# Patient Record
Sex: MALE | Race: OTHER
[De-identification: names, ages, dates, MRNs, and addresses within clinical notes are randomized per-mention and may not be internally consistent; named-entity substitution may affect disease eponyms.]

---

## 2019-07-30 ENCOUNTER — HOSPITAL ENCOUNTER (OUTPATIENT)
Dept: HOSPITAL 72 - PAN | Age: 69
Discharge: HOME | End: 2019-07-30
Payer: MEDICARE

## 2019-07-30 VITALS — DIASTOLIC BLOOD PRESSURE: 72 MMHG | SYSTOLIC BLOOD PRESSURE: 112 MMHG

## 2019-07-30 VITALS — WEIGHT: 191 LBS | HEIGHT: 69 IN | BODY MASS INDEX: 28.29 KG/M2

## 2019-07-30 DIAGNOSIS — Z90.49: ICD-10-CM

## 2019-07-30 DIAGNOSIS — K63.5: Primary | ICD-10-CM

## 2019-07-30 DIAGNOSIS — Z86.010: ICD-10-CM

## 2019-07-30 DIAGNOSIS — Z79.82: ICD-10-CM

## 2019-07-30 DIAGNOSIS — Z88.8: ICD-10-CM

## 2019-07-30 PROCEDURE — 99202 OFFICE O/P NEW SF 15 MIN: CPT

## 2019-07-30 NOTE — CONSULTATION
DATE OF CONSULTATION:  07/30/2019

CHIEF COMPLAINT:  Screening colonoscopy evaluation and history of colonic

polyps.



HISTORY OF PRESENT ILLNESS:  This is a very pleasant 68-year-old [place]

male with past medical history of colonic polyps.  His first colonoscopy

was a few years ago.  According to him, he had 10 polyps.  On the followup

colonoscopy, he had two polyps, which was over 5 years ago.  Now, he has

returned to us for repeat colonoscopy.



PAST MEDICAL HISTORY:  Significant for:



1. Colonic polyps.

2. Gallstone.



PAST SURGICAL HISTORY:

1. Right shoulder surgery.

2. Laparoscopic cholecystectomy.



MEDICATIONS:  _____ aspirin.



ALLERGIES:  Vancomycin.



FAMILY HISTORY:  Noncontributory.



SOCIAL HISTORY:  The patient drinks socially.  Denies any tobacco abuse.

Denies any IV drug abuse.  He is .  Lives at home.



REVIEW OF SYSTEMS:  A 10-point review of systems was performed and

pertinent positives in the HPI.



PHYSICAL EXAMINATION:

VITAL SIGNS:  Temperature 97.3, blood pressure is 112/78, pulse 64, and

respirations 20.

HEENT:  Normocephalic and atraumatic.  Sclerae are anicteric.

NECK:  Supple.  No evidence of obvious lymphadenopathy.

CARDIOVASCULAR:  Regular rate and rhythm.  Plus S1 and S2.  No obvious

murmur.

LUNGS:  Clear to auscultation bilaterally.

ABDOMEN:  Positive bowel sounds.  Soft, nontender.  No rebound.  No

guarding.  No peritoneal sign.

EXTREMITIES:  No cyanosis.  No clubbing.  No edema.



ASSESSMENT AND PLAN:  This is a 68-year-old male with past medical history

of colonic polyps, needs another repeat colonoscopy given 10 polyps in the

last colonoscopy.  The patient was given instruction for colonoscopy and

the prep.  We will schedule for colonoscopy next week.  We will go ahead

and proceed with his repeat colonoscopy.









  ______________________________________________

  Nadeem Guerrero M.D.





DR:  ANNA

D:  07/30/2019 10:42

T:  07/30/2019 21:13

JOB#:  [id number]

CC:

## 2019-08-12 ENCOUNTER — HOSPITAL ENCOUNTER (OUTPATIENT)
Dept: HOSPITAL 72 - GAS | Age: 69
Discharge: HOME | End: 2019-08-12
Payer: MEDICARE

## 2019-08-12 VITALS — DIASTOLIC BLOOD PRESSURE: 73 MMHG | SYSTOLIC BLOOD PRESSURE: 118 MMHG

## 2019-08-12 VITALS — HEIGHT: 69 IN | WEIGHT: 195 LBS | BODY MASS INDEX: 28.88 KG/M2

## 2019-08-12 VITALS — SYSTOLIC BLOOD PRESSURE: 129 MMHG | DIASTOLIC BLOOD PRESSURE: 81 MMHG

## 2019-08-12 VITALS — SYSTOLIC BLOOD PRESSURE: 128 MMHG | DIASTOLIC BLOOD PRESSURE: 67 MMHG

## 2019-08-12 VITALS — DIASTOLIC BLOOD PRESSURE: 75 MMHG | SYSTOLIC BLOOD PRESSURE: 116 MMHG

## 2019-08-12 VITALS — SYSTOLIC BLOOD PRESSURE: 125 MMHG | DIASTOLIC BLOOD PRESSURE: 79 MMHG

## 2019-08-12 VITALS — SYSTOLIC BLOOD PRESSURE: 115 MMHG | DIASTOLIC BLOOD PRESSURE: 78 MMHG

## 2019-08-12 VITALS — DIASTOLIC BLOOD PRESSURE: 78 MMHG | SYSTOLIC BLOOD PRESSURE: 123 MMHG

## 2019-08-12 VITALS — DIASTOLIC BLOOD PRESSURE: 79 MMHG | SYSTOLIC BLOOD PRESSURE: 122 MMHG

## 2019-08-12 VITALS — SYSTOLIC BLOOD PRESSURE: 118 MMHG | DIASTOLIC BLOOD PRESSURE: 76 MMHG

## 2019-08-12 DIAGNOSIS — K29.50: ICD-10-CM

## 2019-08-12 DIAGNOSIS — Z79.899: ICD-10-CM

## 2019-08-12 DIAGNOSIS — Z12.11: Primary | ICD-10-CM

## 2019-08-12 DIAGNOSIS — K57.90: ICD-10-CM

## 2019-08-12 DIAGNOSIS — Z88.8: ICD-10-CM

## 2019-08-12 DIAGNOSIS — Z79.82: ICD-10-CM

## 2019-08-12 DIAGNOSIS — K44.9: ICD-10-CM

## 2019-08-12 DIAGNOSIS — K21.0: ICD-10-CM

## 2019-08-12 PROCEDURE — 43239 EGD BIOPSY SINGLE/MULTIPLE: CPT

## 2019-08-12 PROCEDURE — 94003 VENT MGMT INPAT SUBQ DAY: CPT

## 2019-08-12 PROCEDURE — 45380 COLONOSCOPY AND BIOPSY: CPT

## 2019-08-12 PROCEDURE — 94150 VITAL CAPACITY TEST: CPT

## 2019-08-12 PROCEDURE — 93005 ELECTROCARDIOGRAM TRACING: CPT

## 2019-08-12 NOTE — SHORT STAY SURGERY H&P
History of Present Illness


History of Present Illness


Chief Complaint


see recent office note


HPI


Ria Gonzalez is a 68 year old male who was admitted on  for Gerd,Abdominal 

Pain, Colon Screening





Patient History


Allergies:  


Coded Allergies:  


     VANCOMYCIN (Verified  Allergy, Unknown, 8/12/19)





Medication History


Scheduled


Aspirin Ec* (Aspirin Ec*), 81 MG ORAL DAILY, (Reported)


Dutasteride (Avodart), 0.5 MG ORAL DAILY, (Reported)


Icosapent Ethyl (Vascepa), 1 GM PO DAILY, (Reported)


Saw Hugo (Saw Hugo), Unknown Dose PO DAILY, (Reported)


Tamsulosin HCl (Flomax), 0.4 MG ORAL DAILY, (Reported)





Physical Exam


Vital Signs





Last Vital Signs








  Date Time  Temp Pulse Resp B/P (MAP) Pulse Ox O2 Delivery O2 Flow Rate FiO2


 


8/12/19 08:06      Room Air  


 


8/12/19 07:56 97.3 68 18 125/79 98   











Plan


Attestation


Are the patient's medical conditions optimized for surgery?











Nadeem Guerrero MD Aug 12, 2019 09:25

## 2019-08-12 NOTE — PROCEDURE NOTE
DATE OF PROCEDURE:  08/12/2019

SURGEON:  Nadeem Guerrero M.D.



PROCEDURE:  Upper endoscopy with biopsy and colonoscopy with

biopsy.



ANESTHESIA:  Per Dr. Lenard Rajput.



INSTRUMENT:  Olympus adult flexible upper endoscope and

colonoscope.



INDICATION:  Screening colonoscopy evaluation and chronic GERD.



REASON FOR PROCEDURE:  The procedure, risks, benefits, and possible

consequences, including hemorrhage, aspiration, perforation and infection,

and alternative treatments, were explained to the patient/legal guardian

by Dr. Nadeem Guerrero and the patient/legal guardian understood and

accepted these risks.



PROCEDURE IN DETAIL:  After informed consent was obtained and the patient

was adequately sedated, Olympus upper endoscope was advanced from the

mouth into the second portion of duodenum and retroflexion was performed

in the stomach.



GE junction was found to be about 35 cm from the incisors.  The patient

has evidence of 4 cm hiatal hernia.  Minimum distal esophagitis.  In the

stomach, there was multiple erosions in the antrum of the stomach.  Biopsy

from antrum and body was obtained to rule out H. pylori infection.  At

this time, the upper endoscope was retrieved and the patient was turned

over for colonoscopy.



First, rectal exam was performed, which was normal.  Then, the scope was

advanced from the rectum into the cecum and then subsequently into the

terminal ileum.  Quality of prep was very good.



There was multiple erosions in the ileum, which were biopsied to rule

out colon disease.



The patient had diverticulosis mostly in the left colon.  No obvious

diverticulitis.  No polyp was seen.  Retroflexion of rectum showed

evidence of no obvious large internal hemorrhoids.



SUMMARY OF FINDINGS:

1. A 4 cm hiatal hernia.

2. Minimum distal esophagitis.

3. Multiple antral erosions status post biopsy.

4. Multiple TI erosions status post biopsy.

5. Diverticulosis of the left colon.



RECOMMENDATIONS:  Follow up pathology and treat accordingly.









  ______________________________________________

  Nadeem Guerrero M.D.





DR:  ANNA

D:  08/12/2019 09:48

T:  08/12/2019 19:59

JOB#:  [id number]

CC:

## 2019-08-12 NOTE — PRE-PROCEDURE NOTE/ATTESTATION
Pre-Procedure Note/Attestation


Complete Prior to Procedure


Planned Procedure:  not applicable


Procedure Narrative:


esophagogastroduodenoscopy and colonoscopy





Indications for Procedure


Pre-Operative Diagnosis:


screening colon, GERD





Attestation


I attest that I discussed the nature of the procedure; its benefits; risks and 

complications; and alternatives (and the risks and benefits of such alternatives

), prior to the procedure, with the patient (or the patient's legal 

representative).





I attest that, if there was a reasonable possibility of needing a blood 

transfusion, the patient (or the patient's legal representative) was given the 

[facility] standardized written summary, pursuant 

to the [place] Blood Safety Act ([place] Health and Safety Code # 1645, as 

amended).





I attest that I re-evaluated the patient just prior to the surgery and that 

there has been no change in the patient's H&P, except as documented below:











Nadeem Guerrero MD Aug 12, 2019 09:24

## 2019-08-12 NOTE — ENDOSCOPY PROCEDURE NOTE
Endoscopy Procedure Note


General


Indication for Procedure:  screening colon, GERD


Procedures Performed:  EGD


Operative Findings/Diagnosis:  gastritis, diverticulosis


Specimen:  yes


Pt Tolerated Procedure Well:  Yes


Estimated Blood Loss:  none





Anesthesia


Anesthesiologist:  ramos


Anesthesia:  MAC





Inserted Devices


Implant(s) used?:  No





Quality


Quality of Bowel Preparation:  Good


Did scope reach the cecum?:  Yes


Was there any complications?:  No





GI Core Measures


50 yrs or older w/o bx or poly:  No


10yrs. F/U recommended:  Yes


If not recommended, why?:  Above average risk


18 years or older w/prev. colo:  No











Nadeem Guerrero MD Aug 12, 2019 09:48

## 2019-08-12 NOTE — 48 HOUR POST ANESTHESIA EVAL
Post Anesthesia Evaluation


Procedure:  EGD Colonoscopy


Date of Evaluation:  Aug 12, 2019


Time of Evaluation:  10:30


Blood Pressure Systolic:  118


0:  74


Pulse Rate:  68


Respiratory Rate:  20


Temperature (Fahrenheit):  97.6


O2 Sat by Pulse Oximetry:  98


Airway:  patent


Nausea:  No


Vomiting:  No


Pain Intensity:  1


Hydration Status:  adequate


Cardiopulmonary Status:


stable


Mental Status/LOC:  patient returned to baseline


Follow-up Care/Observations:


n/a


Post-Anesthesia Complications:


none


Follow-up care needed:  ready to discharge











Lenard Rajput MD Aug 12, 2019 10:31

## 2019-08-12 NOTE — IMMEDIATE POST-OP EVALUATION
Immediate Post-Op Evalulation


Immediate Post-Op Evalulation


Procedure:  EGD Colonoscopy


Date of Evaluation:  Aug 12, 2019


Time of Evaluation:  09:55


IV Fluids:  600


Blood Products:  none


Estimated Blood Loss:  none


Urinary Output:  none


Blood Pressure Systolic:  118


Blood Pressure Diastolic:  73


Pulse Rate:  64


Respiratory Rate:  20


O2 Sat by Pulse Oximetry:  98


Temperature (Fahrenheit):  97.6


Pain Score (1-10):  1


Nausea:  No


Vomiting:  No


Complications


none


Patient Status:  awake, patent, none


Hydration Status:  adequate











Lenard Rajput MD Aug 12, 2019 09:56

## 2019-08-12 NOTE — ANETHESIA PREOPERATIVE EVAL
Anesthesia Pre-op PMH/ROS


General


Date of Evaluation:  Aug 12, 2019


Time of Evaluation:  08:45


Anesthesiologist:  Regulo


ASA Score:  ASA 2


Mallampati Score


Class I : Soft palate, uvula, fauces, pillars visible


Class II: Soft palate, uvula, fauces visible


Class III: Soft palate, base of uvula visible


Class IV: Only hard plate visible


Mallampati Classification:  Class II


Surgeon:  Cesar


Diagnosis:  Abdominal pain


Surgical Procedure:  EGD Colonoscopy


Anesthesia History:  none


Family History:  no anesthesia problems


Allergies:  


Coded Allergies:  


     VANCOMYCIN (Verified  Allergy, Unknown, 8/12/19)


Medications:  see eMAR


Patient NPO?:  Yes





Past Medical History


Cardiovascular:  Denies: HTN, CAD, MI, valve dz, arrhythmia, other


Pulmonary:  Denies: asthma, COPD, ALONSO, other


Gastrointestinal/Genitourinary:  Reports: GERD; 


   Denies: CRI, ESRD, other


Neurologic/Psychiatric:  Denies: dementia, CVA, depression/anxiety, TIA, other


Endocrine:  Denies: DM, hypothyroidism, steroids, other


HEENT:  Denies: cataract (L), cataract (R), glaucoma, [place] (L), Q[place]angin (R), other


Hematology/Immune:  Denies: anemia, DVT, bleeding disorder, other


Musculoskeletal/Integumentary:  Denies: OA, RA, DJD, DDD, edema, other


PMH Narrative:


as above


PSxH Narrative:


see H&P





Anesthesia Pre-op Phys. Exam


Physician Exam





Last Vital Signs








  Date Time  Temp Pulse Resp B/P (MAP) Pulse Ox O2 Delivery O2 Flow Rate FiO2


 


8/12/19 08:06      Room Air  


 


8/12/19 07:56 97.3 68 18 125/79 98   








Constitutional:  NAD


Neurologic:  CN 2-12 intact


Cardiovascular:  RRR, no M/R/G


Respiratory:  CTA


Gastrointestinal:  S/NT/ND





Airway Exam


Mallampati Score:  Class II


MO:  full


Neck:  flexible


ROM:  full


Teeth:  intact


Dentures:  no upper, no lower





Anesthesia Pre-op A/P


Risk Assessment & Plan


Assessment:


ASA 2


Plan:


MAC


Status Change Before Surgery:  Lenard Ken MD Aug 12, 2019 08:47

## 2020-02-18 ENCOUNTER — HOSPITAL ENCOUNTER (OUTPATIENT)
Dept: HOSPITAL 72 - PAN | Age: 70
Discharge: HOME | End: 2020-02-18
Payer: MEDICARE

## 2020-02-18 VITALS — DIASTOLIC BLOOD PRESSURE: 69 MMHG | SYSTOLIC BLOOD PRESSURE: 117 MMHG

## 2020-02-18 DIAGNOSIS — K57.90: ICD-10-CM

## 2020-02-18 DIAGNOSIS — Z88.8: ICD-10-CM

## 2020-02-18 DIAGNOSIS — K20.9: ICD-10-CM

## 2020-02-18 DIAGNOSIS — K44.9: Primary | ICD-10-CM

## 2020-02-18 PROCEDURE — 99212 OFFICE O/P EST SF 10 MIN: CPT

## 2020-02-18 NOTE — GENERAL PROGRESS NOTE
Assessment/Plan


Assessment/Plan:





SUMMARY OF FINDINGS:


1. A 4 cm hiatal hernia.


2. Minimum distal esophagitis.


3. Multiple antral erosions status post biopsy.


4. Multiple TI erosions status post biopsy.


5. Diverticulosis of the left colon.








path reviewed


cont ppi


reflux measures


repeat colon in 5 years





Subjective


ROS Limited/Unobtainable:  Yes


Allergies:  


Coded Allergies:  


     VANCOMYCIN (Verified  Allergy, Unknown, 8/12/19)





Objective


General Appearance:  alert


EENT:  normal ENT inspection


Neck:  supple


Cardiovascular:  normal rate


Respiratory/Chest:  decreased breath sounds


Abdomen:  normal bowel sounds, non tender, soft


Extremities:  non-tender











Nadeem Guerrero MD Feb 18, 2020 13:44